# Patient Record
(demographics unavailable — no encounter records)

---

## 2025-06-13 NOTE — ASSESSMENT
[FreeTextEntry1] : 55 y/o male with history as above.  PVCs DL, elevated TG Obese Planned for foot surgery. ET over 4 METs  Recommend Schedule Holter to assess PVC burden.  Schedule 2D echo to evaluate for LV dysfunction. If low burden and normal LV, would not treat in asymptomatic patient, rational discussed. Option of BB and ablation, given RVOT origin discussed. Will hold off until the results are back.  Recommend Calcium score to evaluate for CAD. No stress test as he will not be able to exercise due to his leg surgery and has no angina.  He would like to hold off on CAC score. Trial of diet for elevated TG. Repeat labs with the PMD. Omega-3 recommend for TG elevation.  F/u after the test.

## 2025-06-13 NOTE — HISTORY OF PRESENT ILLNESS
[FreeTextEntry1] : 55 y/o male with history of dyslipidemia, presents for evaluation of PVCs noted on surface ECG in Dr. Crabtree's office. Patient is overweight, trying to loose weight, reports no chest pain or dyspnea. Able to ride his bicycle for several miles with no anginal symptoms and run after his grandkids. He reports no palpitations, no syncope, no edema.  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 NO PVC's today. Labs noted  (last 89), . Normal A1c. Quit tobacco many years ago.

## 2025-07-11 NOTE — ASSESSMENT
[FreeTextEntry1] : 55 y/o male with history as above.  PVCs DL, elevated TG Obese S/p foot surgery. ET over 4 METs  Recommend Reviewed Holter to assess PVC burden - 1.5%. If asymptomatic, continue to monitor. Normal 2D echo with no LV dysfunction. As low burden and normal LV, would not treat in asymptomatic patient, rational discussed. Option of BB and ablation, given RVOT origin discussed. Will hold off for now.  Mildly elevated Calcium score c/w non-obstructive CAD. Recommend low dose rosuvastatin for TG and LDL control. Patient was advised about healthy lifestyle changes, including diet and exercise. Importance of sustained long-term weight loss was discussed, questions answered. Repeat labs with the PMD in 3-4 months  F/u after the test.

## 2025-07-11 NOTE — HISTORY OF PRESENT ILLNESS
[FreeTextEntry1] : 53 y/o male with history of dyslipidemia, presents for evaluation of PVCs noted on surface ECG in Dr. Crabtree's office. Patient is overweight, trying to loose weight, reports no chest pain or dyspnea. Able to ride his bicycle for several miles with no anginal symptoms and run after his grandkids. He reports no palpitations, no syncope, no edema.  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 NO PVC's today. Labs noted  (last 89), . Normal A1c. Quit tobacco many years ago. Echo today - normal LV function. CT for CAC score - 19 (LM plaque). 62nd percentile for age. Results discussed.